# Patient Record
Sex: FEMALE | Race: WHITE | Employment: UNEMPLOYED | URBAN - METROPOLITAN AREA
[De-identification: names, ages, dates, MRNs, and addresses within clinical notes are randomized per-mention and may not be internally consistent; named-entity substitution may affect disease eponyms.]

---

## 2017-06-07 PROBLEM — R10.30 LOWER ABDOMINAL PAIN: Status: ACTIVE | Noted: 2017-06-07

## 2018-08-25 ENCOUNTER — HOSPITAL ENCOUNTER (EMERGENCY)
Age: 33
Discharge: HOME | End: 2018-08-25
Attending: EMERGENCY MEDICINE
Payer: COMMERCIAL

## 2018-08-25 VITALS
DIASTOLIC BLOOD PRESSURE: 98 MMHG | RESPIRATION RATE: 22 BRPM | WEIGHT: 162 LBS | OXYGEN SATURATION: 94 % | BODY MASS INDEX: 23.99 KG/M2 | SYSTOLIC BLOOD PRESSURE: 132 MMHG | HEART RATE: 92 BPM | HEIGHT: 69 IN | TEMPERATURE: 98 F

## 2018-08-25 DIAGNOSIS — F41.9 ANXIETY: Primary | ICD-10-CM

## 2018-08-25 PROCEDURE — 99284 EMERGENCY DEPT VISIT MOD MDM: CPT
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
## 2018-10-12 ENCOUNTER — HOSPITAL ENCOUNTER (EMERGENCY)
Age: 33
Discharge: HOME | End: 2018-10-12
Attending: EMERGENCY MEDICINE
Payer: COMMERCIAL

## 2018-10-12 VITALS
SYSTOLIC BLOOD PRESSURE: 120 MMHG | BODY MASS INDEX: 24.88 KG/M2 | HEART RATE: 85 BPM | RESPIRATION RATE: 14 BRPM | WEIGHT: 168 LBS | TEMPERATURE: 98.1 F | HEIGHT: 69 IN | DIASTOLIC BLOOD PRESSURE: 78 MMHG | OXYGEN SATURATION: 98 %

## 2018-10-12 DIAGNOSIS — J06.9 ACUTE UPPER RESPIRATORY INFECTION: Primary | ICD-10-CM

## 2018-10-12 PROCEDURE — G0381 LEV 2 HOSP TYPE B ED VISIT: HCPCS

## 2018-10-12 PROCEDURE — 99282 EMERGENCY DEPT VISIT SF MDM: CPT

## 2018-10-12 RX ORDER — BROMPHENIRAMINE MALEATE, PSEUDOEPHEDRINE HYDROCHLORIDE, AND DEXTROMETHORPHAN HYDROBROMIDE 2; 30; 10 MG/5ML; MG/5ML; MG/5ML
5 SYRUP ORAL 4 TIMES DAILY PRN
Qty: 120 ML | Refills: 0 | Status: SHIPPED | OUTPATIENT
Start: 2018-10-12 | End: 2019-02-14

## 2018-10-12 RX ORDER — DOXYCYCLINE 100 MG/1
100 CAPSULE ORAL 2 TIMES DAILY
Qty: 20 CAPSULE | Refills: 0 | Status: SHIPPED | OUTPATIENT
Start: 2018-10-12 | End: 2019-02-14

## 2018-10-12 ASSESSMENT — ENCOUNTER SYMPTOMS
ABDOMINAL DISTENTION: 0
DIARRHEA: 0
SHORTNESS OF BREATH: 0
SINUS PRESSURE: 0
EYE DISCHARGE: 0
SORE THROAT: 0
RHINORRHEA: 1
EYE REDNESS: 0
VOMITING: 0
BACK PAIN: 0
EYE PAIN: 0
NAUSEA: 0
COUGH: 1
WHEEZING: 0

## 2018-10-12 NOTE — ED PROVIDER NOTES
Normal      ------------------------------------------ PROGRESS NOTES ------------------------------------------  I have spoken with the patient and discussed todays results, in addition to providing specific details for the plan of care and counseling regarding the diagnosis and prognosis. Their questions are answered at this time and they are agreeable with the plan. I discussed at length with them reasons for immediate return here for re evaluation. They will followup with primary care by calling their office tomorrow. --------------------------------- ADDITIONAL PROVIDER NOTES ---------------------------------  At this time the patient is without objective evidence of an acute process requiring hospitalization or inpatient management. They have remained hemodynamically stable throughout their entire ED visit and are stable for discharge with outpatient follow-up. The plan has been discussed in detail and they are aware of the specific conditions for emergent return, as well as the importance of follow-up. New Prescriptions    BROMPHENIRAMINE-PSEUDOEPHEDRINE-DM 30-2-10 MG/5ML SYRUP    Take 5 mLs by mouth 4 times daily as needed for Congestion or Cough    DOXYCYCLINE MONOHYDRATE (MONODOX) 100 MG CAPSULE    Take 1 capsule by mouth 2 times daily       Diagnosis:  1. Acute upper respiratory infection        Disposition:  Patient's disposition: Discharge to home  Patient's condition is stable.          John Lozoya MD  10/12/18 1047

## 2018-10-25 ENCOUNTER — HOSPITAL ENCOUNTER (OUTPATIENT)
Age: 33
Discharge: HOME | End: 2018-10-25
Payer: COMMERCIAL

## 2018-10-25 LAB
ALBUMIN SERPL-MCNC: 4.1 G/DL (ref 3.5–5.2)
ALP BLD-CCNC: 67 U/L (ref 35–104)
ALT SERPL-CCNC: 7 U/L (ref 0–32)
AMPHETAMINE SCREEN, URINE: NOT DETECTED
ANION GAP SERPL CALCULATED.3IONS-SCNC: 8 MMOL/L (ref 7–16)
AST SERPL-CCNC: 12 U/L (ref 0–31)
BARBITURATE SCREEN URINE: NOT DETECTED
BASOPHILS ABSOLUTE: 0.05 E9/L (ref 0–0.2)
BASOPHILS RELATIVE PERCENT: 0.5 % (ref 0–2)
BENZODIAZEPINE SCREEN, URINE: NOT DETECTED
BILIRUB SERPL-MCNC: 0.9 MG/DL (ref 0–1.2)
BILIRUBIN URINE: NEGATIVE
BLOOD, URINE: NEGATIVE
BUN BLDV-MCNC: 7 MG/DL (ref 6–20)
CALCIUM SERPL-MCNC: 8.6 MG/DL (ref 8.6–10.2)
CANNABINOID SCREEN URINE: POSITIVE
CHLORIDE BLD-SCNC: 104 MMOL/L (ref 98–107)
CHOLESTEROL, FASTING: 149 MG/DL (ref 0–199)
CLARITY: CLEAR
CO2: 27 MMOL/L (ref 22–29)
COCAINE METABOLITE SCREEN URINE: NOT DETECTED
COLOR: YELLOW
CREAT SERPL-MCNC: 0.9 MG/DL (ref 0.5–1)
EKG ATRIAL RATE: 70 BPM
EKG P AXIS: 61 DEGREES
EKG P-R INTERVAL: 144 MS
EKG Q-T INTERVAL: 390 MS
EKG QRS DURATION: 96 MS
EKG QTC CALCULATION (BAZETT): 421 MS
EKG R AXIS: 40 DEGREES
EKG T AXIS: 45 DEGREES
EKG VENTRICULAR RATE: 70 BPM
EOSINOPHILS ABSOLUTE: 0.06 E9/L (ref 0.05–0.5)
EOSINOPHILS RELATIVE PERCENT: 0.7 % (ref 0–6)
GFR AFRICAN AMERICAN: >60
GFR NON-AFRICAN AMERICAN: >60 ML/MIN/1.73
GLUCOSE FASTING: 111 MG/DL (ref 74–109)
GLUCOSE URINE: NEGATIVE MG/DL
HCG(URINE) PREGNANCY TEST: NEGATIVE
HCT VFR BLD CALC: 46.2 % (ref 34–48)
HDLC SERPL-MCNC: 49 MG/DL
HEMOGLOBIN: 15 G/DL (ref 11.5–15.5)
IMMATURE GRANULOCYTES #: 0.04 E9/L
IMMATURE GRANULOCYTES %: 0.4 % (ref 0–5)
KETONES, URINE: NEGATIVE MG/DL
LDL CHOLESTEROL CALCULATED: 75 MG/DL (ref 0–99)
LEUKOCYTE ESTERASE, URINE: NEGATIVE
LYMPHOCYTES ABSOLUTE: 2.14 E9/L (ref 1.5–4)
LYMPHOCYTES RELATIVE PERCENT: 23.5 % (ref 20–42)
MCH RBC QN AUTO: 31.3 PG (ref 26–35)
MCHC RBC AUTO-ENTMCNC: 32.5 % (ref 32–34.5)
MCV RBC AUTO: 96.3 FL (ref 80–99.9)
METHADONE SCREEN, URINE: NOT DETECTED
MONOCYTES ABSOLUTE: 0.65 E9/L (ref 0.1–0.95)
MONOCYTES RELATIVE PERCENT: 7.1 % (ref 2–12)
NEUTROPHILS ABSOLUTE: 6.18 E9/L (ref 1.8–7.3)
NEUTROPHILS RELATIVE PERCENT: 67.8 % (ref 43–80)
NITRITE, URINE: NEGATIVE
OPIATE SCREEN URINE: NOT DETECTED
PDW BLD-RTO: 12.9 FL (ref 11.5–15)
PH UA: 5.5 (ref 5–9)
PHENCYCLIDINE SCREEN URINE: NOT DETECTED
PLATELET # BLD: 257 E9/L (ref 130–450)
PMV BLD AUTO: 10.6 FL (ref 7–12)
POTASSIUM SERPL-SCNC: 4.3 MMOL/L (ref 3.5–5)
PROPOXYPHENE SCREEN: NOT DETECTED
PROTEIN UA: NEGATIVE MG/DL
RBC # BLD: 4.8 E12/L (ref 3.5–5.5)
SODIUM BLD-SCNC: 139 MMOL/L (ref 132–146)
SPECIFIC GRAVITY UA: <=1.005 (ref 1–1.03)
T3 FREE: 2.7 PG/ML (ref 2–4.4)
T4 FREE: 0.97 NG/DL (ref 0.93–1.7)
TOTAL PROTEIN: 6.7 G/DL (ref 6.4–8.3)
TRIGLYCERIDE, FASTING: 126 MG/DL (ref 0–149)
TSH SERPL DL<=0.05 MIU/L-ACNC: 1.73 UIU/ML (ref 0.27–4.2)
UROBILINOGEN, URINE: 0.2 E.U./DL
VLDLC SERPL CALC-MCNC: 25 MG/DL
WBC # BLD: 9.1 E9/L (ref 4.5–11.5)

## 2018-10-25 PROCEDURE — 84481 FREE ASSAY (FT-3): CPT

## 2018-10-25 PROCEDURE — 93005 ELECTROCARDIOGRAM TRACING: CPT | Performed by: CLINICAL NURSE SPECIALIST

## 2018-10-25 PROCEDURE — 36415 COLL VENOUS BLD VENIPUNCTURE: CPT

## 2018-10-25 PROCEDURE — 81003 URINALYSIS AUTO W/O SCOPE: CPT

## 2018-10-25 PROCEDURE — G0480 DRUG TEST DEF 1-7 CLASSES: HCPCS

## 2018-10-25 PROCEDURE — 84443 ASSAY THYROID STIM HORMONE: CPT

## 2018-10-25 PROCEDURE — 80307 DRUG TEST PRSMV CHEM ANLYZR: CPT

## 2018-10-25 PROCEDURE — 81025 URINE PREGNANCY TEST: CPT

## 2018-10-25 PROCEDURE — 84439 ASSAY OF FREE THYROXINE: CPT

## 2018-10-25 PROCEDURE — 85025 COMPLETE CBC W/AUTO DIFF WBC: CPT

## 2018-10-25 PROCEDURE — 80061 LIPID PANEL: CPT

## 2018-10-25 PROCEDURE — 80053 COMPREHEN METABOLIC PANEL: CPT

## 2018-10-29 LAB — CANNABINOIDS CONF, URINE: 180 NG/ML

## 2019-02-14 ENCOUNTER — HOSPITAL ENCOUNTER (EMERGENCY)
Age: 34
Discharge: PSYCHIATRIC OR SUBSTANCE USE FACILITY | End: 2019-02-14
Attending: EMERGENCY MEDICINE
Payer: COMMERCIAL

## 2019-02-14 VITALS
OXYGEN SATURATION: 97 % | TEMPERATURE: 98.5 F | WEIGHT: 160 LBS | HEIGHT: 70 IN | DIASTOLIC BLOOD PRESSURE: 88 MMHG | HEART RATE: 99 BPM | SYSTOLIC BLOOD PRESSURE: 120 MMHG | BODY MASS INDEX: 22.9 KG/M2 | RESPIRATION RATE: 17 BRPM

## 2019-02-14 DIAGNOSIS — N39.0 URINARY TRACT INFECTION WITHOUT HEMATURIA, SITE UNSPECIFIED: ICD-10-CM

## 2019-02-14 DIAGNOSIS — F23 ACUTE PSYCHOSIS (HCC): Primary | ICD-10-CM

## 2019-02-14 DIAGNOSIS — R45.851 SUICIDAL IDEATION: ICD-10-CM

## 2019-02-14 LAB
ACETAMINOPHEN LEVEL: <5 MCG/ML (ref 10–30)
ALBUMIN SERPL-MCNC: 4.7 G/DL (ref 3.5–5.2)
ALP BLD-CCNC: 75 U/L (ref 35–104)
ALT SERPL-CCNC: 17 U/L (ref 0–32)
AMPHETAMINE SCREEN, URINE: POSITIVE
ANION GAP SERPL CALCULATED.3IONS-SCNC: 15 MMOL/L (ref 7–16)
AST SERPL-CCNC: 22 U/L (ref 0–31)
BACTERIA: ABNORMAL /HPF
BARBITURATE SCREEN URINE: NOT DETECTED
BASOPHILS ABSOLUTE: 0.03 E9/L (ref 0–0.2)
BASOPHILS RELATIVE PERCENT: 0.3 % (ref 0–2)
BENZODIAZEPINE SCREEN, URINE: NOT DETECTED
BILIRUB SERPL-MCNC: 1.7 MG/DL (ref 0–1.2)
BILIRUBIN URINE: NEGATIVE
BLOOD, URINE: ABNORMAL
BUN BLDV-MCNC: 13 MG/DL (ref 6–20)
CALCIUM SERPL-MCNC: 9.9 MG/DL (ref 8.6–10.2)
CANNABINOID SCREEN URINE: POSITIVE
CHLORIDE BLD-SCNC: 102 MMOL/L (ref 98–107)
CLARITY: CLEAR
CO2: 22 MMOL/L (ref 22–29)
COCAINE METABOLITE SCREEN URINE: NOT DETECTED
COLOR: YELLOW
CREAT SERPL-MCNC: 0.8 MG/DL (ref 0.5–1)
EOSINOPHILS ABSOLUTE: 0.01 E9/L (ref 0.05–0.5)
EOSINOPHILS RELATIVE PERCENT: 0.1 % (ref 0–6)
EPITHELIAL CELLS, UA: ABNORMAL /HPF
ETHANOL: <10 MG/DL (ref 0–0.08)
GFR AFRICAN AMERICAN: >60
GFR NON-AFRICAN AMERICAN: >60 ML/MIN/1.73
GLUCOSE BLD-MCNC: 100 MG/DL (ref 74–99)
GLUCOSE URINE: NEGATIVE MG/DL
HCG, URINE, POC: NEGATIVE
HCT VFR BLD CALC: 44.2 % (ref 34–48)
HEMOGLOBIN: 15 G/DL (ref 11.5–15.5)
IMMATURE GRANULOCYTES #: 0.02 E9/L
IMMATURE GRANULOCYTES %: 0.2 % (ref 0–5)
KETONES, URINE: NEGATIVE MG/DL
LEUKOCYTE ESTERASE, URINE: ABNORMAL
LYMPHOCYTES ABSOLUTE: 1.91 E9/L (ref 1.5–4)
LYMPHOCYTES RELATIVE PERCENT: 21.8 % (ref 20–42)
Lab: NORMAL
MCH RBC QN AUTO: 31.2 PG (ref 26–35)
MCHC RBC AUTO-ENTMCNC: 33.9 % (ref 32–34.5)
MCV RBC AUTO: 91.9 FL (ref 80–99.9)
METHADONE SCREEN, URINE: NOT DETECTED
MONOCYTES ABSOLUTE: 0.68 E9/L (ref 0.1–0.95)
MONOCYTES RELATIVE PERCENT: 7.8 % (ref 2–12)
NEGATIVE QC PASS/FAIL: NORMAL
NEUTROPHILS ABSOLUTE: 6.11 E9/L (ref 1.8–7.3)
NEUTROPHILS RELATIVE PERCENT: 69.8 % (ref 43–80)
NITRITE, URINE: POSITIVE
OPIATE SCREEN URINE: NOT DETECTED
PDW BLD-RTO: 13.9 FL (ref 11.5–15)
PH UA: 7 (ref 5–9)
PHENCYCLIDINE SCREEN URINE: NOT DETECTED
PLATELET # BLD: 258 E9/L (ref 130–450)
PMV BLD AUTO: 10.5 FL (ref 7–12)
POSITIVE QC PASS/FAIL: NORMAL
POTASSIUM SERPL-SCNC: 3.9 MMOL/L (ref 3.5–5)
PROPOXYPHENE SCREEN: NOT DETECTED
PROTEIN UA: NEGATIVE MG/DL
RBC # BLD: 4.81 E12/L (ref 3.5–5.5)
RBC UA: ABNORMAL /HPF (ref 0–2)
SALICYLATE, SERUM: <0.3 MG/DL (ref 0–30)
SODIUM BLD-SCNC: 139 MMOL/L (ref 132–146)
SPECIFIC GRAVITY UA: 1.01 (ref 1–1.03)
T4 FREE: 1.59 NG/DL (ref 0.93–1.7)
TOTAL PROTEIN: 7.5 G/DL (ref 6.4–8.3)
TRICYCLIC ANTIDEPRESSANTS SCREEN SERUM: NEGATIVE NG/ML
TSH SERPL DL<=0.05 MIU/L-ACNC: 1.91 UIU/ML (ref 0.27–4.2)
UROBILINOGEN, URINE: 0.2 E.U./DL
WBC # BLD: 8.8 E9/L (ref 4.5–11.5)
WBC UA: ABNORMAL /HPF (ref 0–5)

## 2019-02-14 PROCEDURE — 6370000000 HC RX 637 (ALT 250 FOR IP): Performed by: EMERGENCY MEDICINE

## 2019-02-14 PROCEDURE — 87088 URINE BACTERIA CULTURE: CPT

## 2019-02-14 PROCEDURE — 87186 SC STD MICRODIL/AGAR DIL: CPT

## 2019-02-14 PROCEDURE — 36415 COLL VENOUS BLD VENIPUNCTURE: CPT

## 2019-02-14 PROCEDURE — 80307 DRUG TEST PRSMV CHEM ANLYZR: CPT

## 2019-02-14 PROCEDURE — 80053 COMPREHEN METABOLIC PANEL: CPT

## 2019-02-14 PROCEDURE — 84439 ASSAY OF FREE THYROXINE: CPT

## 2019-02-14 PROCEDURE — 99285 EMERGENCY DEPT VISIT HI MDM: CPT

## 2019-02-14 PROCEDURE — 87077 CULTURE AEROBIC IDENTIFY: CPT

## 2019-02-14 PROCEDURE — G0480 DRUG TEST DEF 1-7 CLASSES: HCPCS

## 2019-02-14 PROCEDURE — 81001 URINALYSIS AUTO W/SCOPE: CPT

## 2019-02-14 PROCEDURE — 84443 ASSAY THYROID STIM HORMONE: CPT

## 2019-02-14 PROCEDURE — 93005 ELECTROCARDIOGRAM TRACING: CPT | Performed by: NURSE PRACTITIONER

## 2019-02-14 PROCEDURE — 85025 COMPLETE CBC W/AUTO DIFF WBC: CPT

## 2019-02-14 RX ORDER — GRANULES FOR ORAL 3 G/1
3 POWDER ORAL ONCE
Status: COMPLETED | OUTPATIENT
Start: 2019-02-14 | End: 2019-02-14

## 2019-02-14 RX ADMIN — SERTRALINE HYDROCHLORIDE 50 MG: 50 TABLET ORAL at 20:22

## 2019-02-14 RX ADMIN — FOSFOMYCIN TROMETHAMINE 1 PACKET: 3 POWDER ORAL at 19:44

## 2019-02-14 ASSESSMENT — PAIN DESCRIPTION - PAIN TYPE: TYPE: ACUTE PAIN

## 2019-02-14 ASSESSMENT — PAIN DESCRIPTION - DESCRIPTORS: DESCRIPTORS: HEADACHE

## 2019-02-14 ASSESSMENT — PAIN DESCRIPTION - LOCATION: LOCATION: HEAD

## 2019-02-14 ASSESSMENT — PAIN SCALES - GENERAL: PAINLEVEL_OUTOF10: 5

## 2019-02-16 LAB
ORGANISM: ABNORMAL
URINE CULTURE, ROUTINE: ABNORMAL
URINE CULTURE, ROUTINE: ABNORMAL

## 2019-02-17 LAB
EKG ATRIAL RATE: 83 BPM
EKG P AXIS: 90 DEGREES
EKG P-R INTERVAL: 152 MS
EKG Q-T INTERVAL: 358 MS
EKG QRS DURATION: 88 MS
EKG QTC CALCULATION (BAZETT): 420 MS
EKG R AXIS: 44 DEGREES
EKG T AXIS: 61 DEGREES
EKG VENTRICULAR RATE: 83 BPM

## 2019-02-20 LAB
AMPHETAMINES, URINE: 1032 NG/ML
CANNABINOIDS CONF, URINE: 140 NG/ML
METHAMPHETAMINE, URINE: 4714 NG/ML
METHYLENEDIOXYAMPHETAMINE, UR: <200 NG/ML
METHYLENEDIOXYETHYLAMPHETAMINE, UR: <200 NG/ML
METHYLENEDIOXYMETHAMPHETAMINE, UR: <200 NG/ML

## 2019-03-01 ENCOUNTER — HOSPITAL ENCOUNTER (EMERGENCY)
Age: 34
Discharge: HOME | End: 2019-03-01
Attending: EMERGENCY MEDICINE
Payer: COMMERCIAL

## 2019-03-01 VITALS
WEIGHT: 154 LBS | HEART RATE: 85 BPM | BODY MASS INDEX: 22.81 KG/M2 | TEMPERATURE: 98.2 F | SYSTOLIC BLOOD PRESSURE: 120 MMHG | HEIGHT: 69 IN | OXYGEN SATURATION: 100 % | RESPIRATION RATE: 16 BRPM | DIASTOLIC BLOOD PRESSURE: 72 MMHG

## 2019-03-01 DIAGNOSIS — F32.A DEPRESSION, UNSPECIFIED DEPRESSION TYPE: Primary | ICD-10-CM

## 2019-03-01 DIAGNOSIS — R45.851 VERBALIZES SUICIDAL THOUGHTS: ICD-10-CM

## 2019-03-01 DIAGNOSIS — F41.1 ANXIETY STATE: ICD-10-CM

## 2019-03-01 LAB
ACETAMINOPHEN LEVEL: <5 MCG/ML (ref 10–30)
AMPHETAMINE SCREEN, URINE: POSITIVE
ANION GAP SERPL CALCULATED.3IONS-SCNC: 8 MMOL/L (ref 7–16)
BARBITURATE SCREEN URINE: NOT DETECTED
BASOPHILS ABSOLUTE: 0.04 E9/L (ref 0–0.2)
BASOPHILS RELATIVE PERCENT: 0.4 % (ref 0–2)
BENZODIAZEPINE SCREEN, URINE: NOT DETECTED
BUN BLDV-MCNC: 7 MG/DL (ref 6–20)
CALCIUM SERPL-MCNC: 9.4 MG/DL (ref 8.6–10.2)
CANNABINOID SCREEN URINE: POSITIVE
CHLORIDE BLD-SCNC: 102 MMOL/L (ref 98–107)
CO2: 28 MMOL/L (ref 22–29)
COCAINE METABOLITE SCREEN URINE: NOT DETECTED
CREAT SERPL-MCNC: 0.8 MG/DL (ref 0.5–1)
EOSINOPHILS ABSOLUTE: 0.03 E9/L (ref 0.05–0.5)
EOSINOPHILS RELATIVE PERCENT: 0.3 % (ref 0–6)
ETHANOL: <10 MG/DL (ref 0–0.08)
GFR AFRICAN AMERICAN: >60
GFR NON-AFRICAN AMERICAN: >60 ML/MIN/1.73
GLUCOSE BLD-MCNC: 132 MG/DL (ref 74–99)
HCG, URINE, POC: NEGATIVE
HCT VFR BLD CALC: 43.9 % (ref 34–48)
HEMOGLOBIN: 14.5 G/DL (ref 11.5–15.5)
IMMATURE GRANULOCYTES #: 0.03 E9/L
IMMATURE GRANULOCYTES %: 0.3 % (ref 0–5)
LYMPHOCYTES ABSOLUTE: 1.62 E9/L (ref 1.5–4)
LYMPHOCYTES RELATIVE PERCENT: 18.1 % (ref 20–42)
Lab: NORMAL
MCH RBC QN AUTO: 31.8 PG (ref 26–35)
MCHC RBC AUTO-ENTMCNC: 33 % (ref 32–34.5)
MCV RBC AUTO: 96.3 FL (ref 80–99.9)
METHADONE SCREEN, URINE: NOT DETECTED
MONOCYTES ABSOLUTE: 0.7 E9/L (ref 0.1–0.95)
MONOCYTES RELATIVE PERCENT: 7.8 % (ref 2–12)
NEGATIVE QC PASS/FAIL: NORMAL
NEUTROPHILS ABSOLUTE: 6.54 E9/L (ref 1.8–7.3)
NEUTROPHILS RELATIVE PERCENT: 73.1 % (ref 43–80)
OPIATE SCREEN URINE: NOT DETECTED
PDW BLD-RTO: 13.7 FL (ref 11.5–15)
PHENCYCLIDINE SCREEN URINE: NOT DETECTED
PLATELET # BLD: 287 E9/L (ref 130–450)
PMV BLD AUTO: 10.4 FL (ref 7–12)
POSITIVE QC PASS/FAIL: NORMAL
POTASSIUM SERPL-SCNC: 3.6 MMOL/L (ref 3.5–5)
PROPOXYPHENE SCREEN: NOT DETECTED
RBC # BLD: 4.56 E12/L (ref 3.5–5.5)
SALICYLATE, SERUM: <0.3 MG/DL (ref 0–30)
SODIUM BLD-SCNC: 138 MMOL/L (ref 132–146)
T4 TOTAL: 6.6 MCG/DL (ref 4.5–11.7)
TRICYCLIC ANTIDEPRESSANTS SCREEN SERUM: NEGATIVE NG/ML
TSH SERPL DL<=0.05 MIU/L-ACNC: 2.74 UIU/ML (ref 0.27–4.2)
WBC # BLD: 9 E9/L (ref 4.5–11.5)

## 2019-03-01 PROCEDURE — 80048 BASIC METABOLIC PNL TOTAL CA: CPT

## 2019-03-01 PROCEDURE — 80307 DRUG TEST PRSMV CHEM ANLYZR: CPT

## 2019-03-01 PROCEDURE — 84436 ASSAY OF TOTAL THYROXINE: CPT

## 2019-03-01 PROCEDURE — 85025 COMPLETE CBC W/AUTO DIFF WBC: CPT

## 2019-03-01 PROCEDURE — 93005 ELECTROCARDIOGRAM TRACING: CPT | Performed by: EMERGENCY MEDICINE

## 2019-03-01 PROCEDURE — 84443 ASSAY THYROID STIM HORMONE: CPT

## 2019-03-01 PROCEDURE — G0480 DRUG TEST DEF 1-7 CLASSES: HCPCS

## 2019-03-01 PROCEDURE — 36415 COLL VENOUS BLD VENIPUNCTURE: CPT

## 2019-03-01 PROCEDURE — 99283 EMERGENCY DEPT VISIT LOW MDM: CPT

## 2019-03-01 RX ORDER — GABAPENTIN 300 MG/1
300 CAPSULE ORAL 3 TIMES DAILY
COMMUNITY
End: 2019-04-18 | Stop reason: SDUPTHER

## 2019-03-01 ASSESSMENT — ENCOUNTER SYMPTOMS
SINUS PAIN: 0
DIARRHEA: 0
BACK PAIN: 0
ABDOMINAL PAIN: 0
SINUS PRESSURE: 0
VOMITING: 0
SORE THROAT: 0
NAUSEA: 0
RHINORRHEA: 0
CONSTIPATION: 0
WHEEZING: 0
SHORTNESS OF BREATH: 0
COUGH: 0
PHOTOPHOBIA: 0

## 2019-03-02 LAB
EKG ATRIAL RATE: 100 BPM
EKG P AXIS: 76 DEGREES
EKG P-R INTERVAL: 158 MS
EKG Q-T INTERVAL: 382 MS
EKG QRS DURATION: 88 MS
EKG QTC CALCULATION (BAZETT): 492 MS
EKG R AXIS: 54 DEGREES
EKG T AXIS: 56 DEGREES
EKG VENTRICULAR RATE: 100 BPM

## 2019-03-06 LAB — CANNABINOIDS CONF, URINE: 69 NG/ML

## 2019-03-08 LAB
AMPHETAMINES, URINE: 369 NG/ML
METHAMPHETAMINE, URINE: 4844 NG/ML
METHYLENEDIOXYAMPHETAMINE, UR: <200 NG/ML
METHYLENEDIOXYETHYLAMPHETAMINE, UR: <200 NG/ML
METHYLENEDIOXYMETHAMPHETAMINE, UR: <200 NG/ML

## 2019-04-18 PROBLEM — E04.1 THYROID NODULE: Status: ACTIVE | Noted: 2017-11-06

## 2019-04-18 PROBLEM — R59.1 LYMPHADENOPATHY: Status: ACTIVE | Noted: 2017-11-06

## 2019-04-18 PROBLEM — F19.10 SUBSTANCE ABUSE (HCC): Status: ACTIVE | Noted: 2019-04-18

## 2019-04-18 PROBLEM — N94.6 DYSMENORRHEA: Status: ACTIVE | Noted: 2019-04-18

## 2019-05-22 PROBLEM — R87.618 ABNORMAL PAPANICOLAOU SMEAR OF CERVIX WITH POSITIVE HUMAN PAPILLOMA VIRUS (HPV) TEST: Status: ACTIVE | Noted: 2019-05-22

## 2019-09-18 PROBLEM — Z98.890 S/P CONE BIOPSY OF CERVIX: Status: ACTIVE | Noted: 2019-09-18

## 2020-03-05 PROBLEM — F19.11 HISTORY OF SUBSTANCE ABUSE (HCC): Status: ACTIVE | Noted: 2019-04-18

## 2020-03-05 PROBLEM — F43.10 PTSD (POST-TRAUMATIC STRESS DISORDER): Status: ACTIVE | Noted: 2020-03-05

## 2021-10-04 PROBLEM — R20.0 RIGHT SIDED NUMBNESS: Status: ACTIVE | Noted: 2021-10-04

## 2021-10-05 PROBLEM — Z72.0 TOBACCO ABUSE: Status: ACTIVE | Noted: 2021-10-05

## 2021-10-11 PROBLEM — R20.2 PARESTHESIA OF RIGHT ARM AND LEG: Status: ACTIVE | Noted: 2021-10-11

## 2021-10-11 PROBLEM — R53.1 WEAKNESS: Status: ACTIVE | Noted: 2021-10-11

## 2021-10-11 PROBLEM — G47.10 HYPERSOMNIA: Status: ACTIVE | Noted: 2021-10-11

## 2021-10-11 PROBLEM — R63.4 WEIGHT LOSS: Status: ACTIVE | Noted: 2021-10-11

## 2021-10-11 PROBLEM — R41.3 MEMORY DEFICIT: Status: ACTIVE | Noted: 2021-10-11

## 2021-10-11 PROBLEM — R25.2 LEG CRAMPS: Status: ACTIVE | Noted: 2021-10-11

## 2022-07-18 PROBLEM — N81.6 POP-Q STAGE 2 RECTOCELE: Status: ACTIVE | Noted: 2022-07-18

## 2022-07-18 PROBLEM — N32.81 OAB (OVERACTIVE BLADDER): Status: ACTIVE | Noted: 2022-07-18

## 2022-07-18 PROBLEM — R19.8 ABNORMAL DEFECATION: Status: ACTIVE | Noted: 2022-07-18

## 2022-07-18 PROBLEM — M62.89 PELVIC FLOOR TENSION: Status: ACTIVE | Noted: 2022-07-18

## 2022-07-18 PROBLEM — N94.19 DYSPAREUNIA DUE TO MEDICAL CONDITION IN FEMALE: Status: ACTIVE | Noted: 2022-07-18

## 2022-07-18 PROBLEM — N39.3 SUI (STRESS URINARY INCONTINENCE, FEMALE): Status: ACTIVE | Noted: 2022-07-18

## 2022-08-19 PROBLEM — N81.4 UTEROVAGINAL PROLAPSE: Status: ACTIVE | Noted: 2022-08-19

## 2022-09-29 PROBLEM — N93.9 ABNORMAL UTERINE BLEEDING (AUB): Status: ACTIVE | Noted: 2022-09-29

## 2022-09-29 PROBLEM — R10.2 PELVIC PAIN: Status: ACTIVE | Noted: 2022-09-29

## 2022-09-29 PROBLEM — Z98.890 POST-OPERATIVE STATE: Status: ACTIVE | Noted: 2022-09-29

## 2022-10-14 PROBLEM — K85.90 PANCREATITIS, UNSPECIFIED PANCREATITIS TYPE: Status: ACTIVE | Noted: 2022-10-14

## 2022-10-14 PROBLEM — K85.00 IDIOPATHIC ACUTE PANCREATITIS WITHOUT INFECTION OR NECROSIS: Status: ACTIVE | Noted: 2022-10-14

## 2022-10-29 PROBLEM — Z98.890 POST-OPERATIVE STATE: Status: RESOLVED | Noted: 2022-09-29 | Resolved: 2022-10-29
